# Patient Record
Sex: MALE | Race: WHITE | NOT HISPANIC OR LATINO | Employment: UNEMPLOYED | URBAN - METROPOLITAN AREA
[De-identification: names, ages, dates, MRNs, and addresses within clinical notes are randomized per-mention and may not be internally consistent; named-entity substitution may affect disease eponyms.]

---

## 2020-04-21 ENCOUNTER — VIRTUAL VISIT (OUTPATIENT)
Dept: FAMILY MEDICINE | Facility: CLINIC | Age: 60
End: 2020-04-21

## 2020-04-21 DIAGNOSIS — R51.9 ACUTE NONINTRACTABLE HEADACHE, UNSPECIFIED HEADACHE TYPE: Primary | ICD-10-CM

## 2020-04-21 PROCEDURE — 99202 OFFICE O/P NEW SF 15 MIN: CPT | Mod: 95 | Performed by: NURSE PRACTITIONER

## 2020-04-21 NOTE — LETTER
[facility]  [address] [place] [id number] [phone]          April 21, 2020  RE:  Vasyl Park                                                                                                            [address] [facility] [address]      To whom it may concern:    Vasyl Park is under my professional care. Please excuse him from work today, 4/21/2020. He had a self limited health issue without concerning symptoms for COVID19. This has resolved and he feels comfortable returning to work on 4/22/2020.    Sincerely,      Ayleen Cooper CNP  [facility]

## 2020-04-21 NOTE — PROGRESS NOTES
"Problem(s) Oriented visit        SUBJECTIVE:                                                    Vasyl Park is a 59 year old male who presents for a telephone visit regarding a headache    The patient has been notified of following:     \"This telephone visit will be conducted via a call between you and your physician/provider. We have found that certain health care needs can be provided without the need for an in-person physical exam.  This service lets us provide the care you need with a telephone conversation.  If a prescription is necessary we can send it directly to your pharmacy.  If lab work is needed we can place an order for that and you can then stop by our lab to have the test done at a later time.    If during the course of the call the physician/provider feels a telephone visit is not appropriate, you will not be charged for this service.\"     Provider has received verbal consent for a video visit from the patient? Yes      Vasyl is currently feeling well. He developed a headache in the middle of the night, located at the top of his skull in the center- described it as a 5/10 on the pain scale but described this as \"not painful, just annoying.\" Unable to further describe the headache- denies pulsating, throbbing, electric, or tense squeezing type pain. No photophobia, sonophobia, fevers, chills, nausea, vomiting, changes in vision, weakness, slurred speech, confusion. He does admit that he was drinking alcohol last night but states he did not feel hungover. Reports that he gets these types of headaches a couple times per year and states they are migraines. He did not take anything for the discomfort, but stayed home from work and rested with resolution of the headache. He is currently feeling well without any residual headache, fatigue, or nausea. No neurologic changes. His work is requiring him to have a note to get back to work due to COVID19 outbreak. He denies persistent headache, fevers, chills, " malaise, fatigue, myalgias, cough, shortness of breath, rhinorrhea, congestion, nausea, vomiting, diarrhea, anosmia. He would like to go to work tomorrow.    Problem list, Medication list, Allergies, and Medical/Social/Surgical histories reviewed in [facility] and updated as appropriate.   Additional history: as documented    ROS:  Constitutional, HEENT, CV, resp, GI, MSK, neuro negative except as listed per HPI    Histories:   There is no problem list on file for this patient.    No past surgical history on file.    Social History     Tobacco Use     Smoking status: Not on file   Substance Use Topics     Alcohol use: Not on file     No family history on file.      No current outpatient medications on file.       OBJECTIVE:                                                    No vitals taken- telephone visit  Constitutional: Speaking in full sentences  Psych: Pleasant and interactive, answers questions appropriately, thought content logical       ASSESSMENT/PLAN:                                                        Diagnoses and all orders for this visit:    Acute nonintractable headache, unspecified headache type    Headache that was self limited and resolved- discussed with him that his symptoms are not consistent with a migraine- possibly a tension type headache, possibly related to alcohol last night. No neurologic deficits per his report, no evidence of a viral syndrome. His symptoms have completely resolved and he would like a note to return to work. Note written, reviewed s/sxs COVID19.    Patient needs assistance with ADLs: none identified today  Patient needs assistance with iADLs: none identified today    The following health maintenance items are reviewed in Epic and correct as of today:  Health Maintenance   Topic Date Due     PREVENTIVE CARE VISIT  1960     HEPATITIS C SCREENING  1960     ADVANCE CARE PLANNING  1960     COLORECTAL CANCER SCREENING  12/05/1971     DTAP/TDAP/TD IMMUNIZATION (1  - Tdap) 12/05/1972     HIV SCREENING  12/05/1976     LIPID  12/05/1996     ZOSTER IMMUNIZATION (1 of 2) 12/05/2011     INFLUENZA VACCINE (1) 09/01/2019     PHQ-2  01/01/2020     IPV IMMUNIZATION  Aged Out     MENINGITIS IMMUNIZATION  Aged Out       This was a telephone visit conducted during COVID-19 outbreak in regulation with social distancing and quarantine recommendations of the CDC and [place] department of health and human services. A two way audio connection was used in real time with patient's consent.    FERNY Pollock CNP  [facility]  Family Practice  [facility]  [phone]    For any issues my office # is [phone]

## 2020-08-26 ENCOUNTER — ANCILLARY PROCEDURE (OUTPATIENT)
Dept: GENERAL RADIOLOGY | Facility: CLINIC | Age: 60
End: 2020-08-26
Attending: PHYSICIAN ASSISTANT
Payer: COMMERCIAL

## 2020-08-26 ENCOUNTER — OFFICE VISIT (OUTPATIENT)
Dept: URGENT CARE | Facility: URGENT CARE | Age: 60
End: 2020-08-26
Payer: COMMERCIAL

## 2020-08-26 VITALS
SYSTOLIC BLOOD PRESSURE: 85 MMHG | WEIGHT: 200 LBS | OXYGEN SATURATION: 100 % | DIASTOLIC BLOOD PRESSURE: 62 MMHG | HEART RATE: 92 BPM | TEMPERATURE: 98.5 F

## 2020-08-26 DIAGNOSIS — M79.671 RIGHT FOOT PAIN: ICD-10-CM

## 2020-08-26 DIAGNOSIS — S92.331A CLOSED DISPLACED FRACTURE OF THIRD METATARSAL BONE OF RIGHT FOOT, INITIAL ENCOUNTER: Primary | ICD-10-CM

## 2020-08-26 PROCEDURE — 99214 OFFICE O/P EST MOD 30 MIN: CPT | Performed by: PHYSICIAN ASSISTANT

## 2020-08-26 PROCEDURE — 73630 X-RAY EXAM OF FOOT: CPT | Mod: RT

## 2020-08-26 ASSESSMENT — ENCOUNTER SYMPTOMS
PSYCHIATRIC NEGATIVE: 1
RESPIRATORY NEGATIVE: 1
CARDIOVASCULAR NEGATIVE: 1
CONSTITUTIONAL NEGATIVE: 1
EYES NEGATIVE: 1
NEUROLOGICAL NEGATIVE: 1
GASTROINTESTINAL NEGATIVE: 1

## 2020-08-26 NOTE — LETTER
August 26, 2020      To Whom It May Concern:      Vasyl Park was seen in our urgent care today, 08/26/20.  I expect his condition to improve over the next 6-8 weeks.  He may return to work when improved.    Sincerely,        Chano Whaley PA-C

## 2020-08-27 NOTE — PATIENT INSTRUCTIONS
Patient Education     RICE     Rest an injury, elevate it, and use ice and compression as directed.   RICE stands for rest, ice, compression, and elevation. These can limit pain and swelling after an injury. RICE may be recommended to help treat breaks (fractures), sprains, strains, and bruises or bumps.   Home care  Here are the details of RICE:    Rest. Limit the use of the injured body part. This helps prevent further damage to the body part and gives it time to heal. In some cases, you may need a sling, brace, splint, or cast to help keep the body part still until it has healed.    Ice. Applying ice right after an injury helps relieve pain and swelling. To make an ice pack, put ice cubes in a plastic bag that seals at the top. Wrap the bag in a clean, thin towel or cloth. Then place it over the injured area. Do this for 10 to 15 minutes every 3 to 4 hours. Continue for the next 1 to 3 days or until your symptoms improve. Never put ice directly on your skin. Don't ice an area longer than 15 minutes at a time.    Compression. Putting pressure on an injury helps reduce swelling and provides support. Wrap the injured area firmly with an elastic bandage or wrap. Make sure not to wrap the bandage too tightly or you will cut off blood flow to the injured area. If your bandage loosens, rewrap it.    Elevation. Keeping an injury raised or elevated above the level of your heart reduces swelling, pain, and throbbing. For instance, if you have a broken leg, it may help to rest your leg on several pillows when sitting or lying down. Try to keep the injured area elevated as often as possible.  Follow-up care  Follow up with your healthcare provider, or as advised.  When to seek medical advice  Call your healthcare provider right away if any of these occur:    Fever of 100.4 F (38 C) or higher, or as directed by your healthcare provider    Chills    Increased pain or swelling in the injured body part    Injured body part  becomes cold, blue, numb, or tingly    Signs of infection. These include warmth in the skin, redness, drainage, or bad smell coming from the injured body part.  Date Last Reviewed: 6/1/2018 2000-2019 [employer]. [address] [place] [id number]. All rights reserved. This information is not intended as a substitute for professional medical care. Always follow your healthcare professional's instructions.

## 2020-08-27 NOTE — PROGRESS NOTES
SUBJECTIVE:   Vasyl Park is a 59 year old male presenting with a chief complaint of   Chief Complaint   Patient presents with     Urgent Care     Musculoskeletal Problem     pt jumped off a bench a week and half ago and hurt right foot.       He is an established patient of [facility].    MS Injury/Pain    Onset of symptoms was 1 week(s) ago.  Location: right foot  Context:       The injury happened while at home      Mechanism: see above      Patient experienced delayed pain, delayed swelling, was able to bear weight directly after injury, no deformity was noted by the patient  Course of symptoms is worsening.    Severity moderate  Current and Associated symptoms: Pain and Swelling  Denies  Bruising, Decreased range of motion and Stiffness  Aggravating Factors: walking, running and weight-bearing  Therapies to improve symptoms include: ice, Tylenol, rest and elevation  This is the first time this type of problem has occurred for this patient.     Review of Systems   Constitutional: Negative.    HENT: Negative.    Eyes: Negative.    Respiratory: Negative.    Cardiovascular: Negative.    Gastrointestinal: Negative.    Genitourinary: Negative.    Neurological: Negative.    Psychiatric/Behavioral: Negative.        No past medical history on file.  No family history on file.  No current outpatient medications on file.     Social History     Tobacco Use     Smoking status: Not on file   Substance Use Topics     Alcohol use: Not on file       OBJECTIVE  BP (!) 85/62   Pulse 92   Temp 98.5  F (36.9  C) (Oral)   Wt 90.7 kg (200 lb)   SpO2 100%     Physical Exam  Constitutional:       General: He is not in acute distress.     Appearance: Normal appearance. He is normal weight. He is not ill-appearing, toxic-appearing or diaphoretic.   HENT:      Head: Normocephalic and atraumatic.   Cardiovascular:      Rate and Rhythm: Normal rate and regular rhythm.      Pulses: Normal pulses.      Heart sounds: Normal heart sounds.  No murmur. No friction rub. No gallop.    Pulmonary:      Effort: Pulmonary effort is normal. No respiratory distress.      Breath sounds: Normal breath sounds. No stridor. No wheezing, rhonchi or rales.   Chest:      Chest wall: No tenderness.   Musculoskeletal:      Right foot: Normal range of motion. No deformity.      Left foot: Normal range of motion. No deformity.        Feet:    Feet:      Right foot:      Skin integrity: Skin breakdown, callus, dry skin and fissure present. No warmth.      Toenail Condition: Right toenails are abnormally thick. Fungal disease present.     Left foot:      Skin integrity: Skin breakdown, callus, dry skin and fissure present. No warmth.      Toenail Condition: Left toenails are abnormally thick. Fungal disease present.     Comments: Foot is tender in the area shown above.   Neurological:      General: No focal deficit present.      Mental Status: He is alert and oriented to person, place, and time. Mental status is at baseline.      Sensory: No sensory deficit.      Motor: No weakness.      Gait: Gait normal.      Deep Tendon Reflexes: Reflexes normal.   Psychiatric:         Mood and Affect: Mood normal.         Behavior: Behavior normal.         Thought Content: Thought content normal.         Judgment: Judgment normal.       An X-ray was ordered today and reviewed by me. There does appear to be a fracture of the 3rd metatarsal. Fragments present. Awaiting radiology report.       ASSESSMENT/PLAN:    (S92.331A) Closed displaced fracture of third metatarsal bone of right foot, initial encounter  (primary encounter diagnosis)  Plan: Orthopedic & Spine  Referral,         Ankle/Foot Bracing Supplies Order for DME -         ONLY FOR DME, Crutches Order for DME - ONLY FOR        DME    (M79.671) Right foot pain  Plan: XR Foot Right G/E 3 Views, Orthopedic & Spine          Referral, Ankle/Foot Bracing Supplies        Order for DME - ONLY FOR DME, Crutches Order          for DME - ONLY FOR DME    Rest the affected area as much as possible.  Apply ice for 15-20 minutes intermittently as needed and especially after any offending activity. Hot packs are better for muscle spasms and cramping. Daily stretching as tolerated.  As pain recedes, begin normal activities slowly as tolerated.  Consider Physical Therapy after 6 weeks if symptoms not better with conservative care.      Okay to take acetaminophen 500 mg- 2 tabs (Total of 1000 mg) every 8 hrs   Okay to take ibuprofen 200 mg- 3 tabs (Total of 600 mg) every 6 hours      Follow up with orthopedics within the week.     Chano Whaley PA-C on 8/26/2020 at 8:25 PM

## 2020-09-04 ENCOUNTER — OFFICE VISIT (OUTPATIENT)
Dept: PODIATRY | Facility: CLINIC | Age: 60
End: 2020-09-04
Payer: COMMERCIAL

## 2020-09-04 VITALS — HEART RATE: 84 BPM | SYSTOLIC BLOOD PRESSURE: 128 MMHG | DIASTOLIC BLOOD PRESSURE: 80 MMHG

## 2020-09-04 DIAGNOSIS — S92.331A CLOSED DISPLACED FRACTURE OF THIRD METATARSAL BONE OF RIGHT FOOT, INITIAL ENCOUNTER: Primary | ICD-10-CM

## 2020-09-04 PROCEDURE — 28470 CLTX METATARSAL FX WO MNP EA: CPT | Mod: RT | Performed by: PODIATRIST

## 2020-09-04 PROCEDURE — 99203 OFFICE O/P NEW LOW 30 MIN: CPT | Mod: 57 | Performed by: PODIATRIST

## 2020-09-04 NOTE — LETTER
9/4/2020         RE: Vasyl Park  [address]  [facility] [address]        Dear Colleague,    Thank you for referring your patient, Vasyl Park, to the [facility]. Please see a copy of my visit note below.    Subjective:    Pt is seen today in consult from Chano Whaley for right third metatarsal fracture.  Patient injured his right foot at work on 8/19/2020.  Moving heavy object which came on top of his foot.  He had pain.  He ignored this and continue to walk on it.  Slowly over the next few days foot became more painful.  He continue to work.  He presented to clinic on 826 and diagnosed with a fracture.  Was given a walking boot.  This is helped.  He works on his feet.  He has not gone back to work.  The patient is a smoker.  He is to drink alcohol quite heavily in the past and still drinks but last.  He also has history of psoriasis.  He states this affects both of his feet.      ROS:  A 10-point review of systems was performed and is positive for that noted in the HPI and as seen above.  All other areas are negative.        No Known Allergies    No current outpatient medications on file.       There is no problem list on file for this patient.      No past medical history on file.    No past surgical history on file.    No family history on file.    Social History     Tobacco Use     Smoking status: Smoker, Current Status Unknown     Smokeless tobacco: Never Used   Substance Use Topics     Alcohol use: Not on file         Exam:    Vitals: /80   Pulse 84   BMI: There is no height or weight on file to calculate BMI.  Height: Data Unavailable    Constitutional/ general:  Pt is in no apparent distress, appears well-nourished.  Cooperative with history and physical exam.     Psych:  The patient answered questions appropriately.  Normal affect.  Seems to have reasonable expectations, in terms of treatment.     Eyes:  Visual scanning/ tracking without deficit.     Ears:  Response to  auditory stimuli is normal.  negative hearing aid devices.  Auricles in proper alignment.     Lymphatic:  Popliteal lymph nodes not enlarged.     Lungs:  Non labored breathing, non labored speech. No cough.  No audible wheezing. Even, quiet breathing.       Vascular:  positive pedal pulses bilaterally for  DP arteries.  Somewhat difficult to palpate tibial arteries secondary to ankle edema.  CFT < 3 sec.  positive ankle edema.  No hair growth noted.  His toes are cold to touch.    Neuro:  Alert and oriented x 3. Coordinated gait.  Light touch sensation is intact to the L4, L5, S1 distributions. No obvious deficits.  No evidence of neurological-based weakness, spasticity, or contracture in the lower extremities.     Derm: His skin is very dry and erythematous globally on his foot.  No hair growth is noted.  Plantarly the skin is quite thick.    Musculoskeletal:    Lower extremity muscle strength is normal.  Patient is ambulatory without an assistive device or brace.  No gross deformities.  Normal arch.  Pain upon palpation  of right third  metatarsal.  Edema  noted.  No erythema or ecchymosis noted.  No sign of ulceration, infection, or drainage.      Radiographic Exam:  X-Ray Findings:  I personally reviewed the films.    FOOT THREE VIEWS RIGHT  8/26/2020 8:20 PM      HISTORY: Right foot pain     COMPARISON: None.                                                                      IMPRESSION: Acute transversely oriented fracture of the third  metatarsal mid diaphysis. There is 2 mm dorsal displacement of the  dominant distal fracture fragment. There are small mildly displaced  fracture fragments medially and laterally. No intra-articular  extension. Mild soft tissue swelling. There is normal joint spacing  and alignment. Chronic deformity of the fifth proximal phalanx. Small  plantar and Achilles calcaneal enthesophytes.    Assessment: Right foot third metatarsal fracture     Plan:  X-rays personally reviewed.   Discussed etiology and treatment options with the patient in detail.   Discussed with patient that metatarsal slightly elevated and shorter.  This could cause adjacent metatarsalgia in the future.  We discussed ways to compensated for this.  He is not a good surgical candidate.  After long discussions of pros and cons of conservative versus surgical treatment he elects to have conservative treatment.  Will dispense plantarflexed cam walker today to offload his metatarsal heads if he has to bear weight on this..  Patient to wear this at all times to protect foot while moving around.  Discussed even though he has boot on foot he should not walk.  He has crutches.  We discussed knee walker but he declines..  When not walking patient will take this off and do ROM to prevent blood clot and joint stiffness.  Patient will not sleep with this on.  If displacement or signs of slow healing pt may require surgery.  With patient's past medical history anticipate this to be slow to heal so wrote work note for no work for next 8 weeks.  F/U 2 wks for repeat x-ray.  Fracture care.   thank you for allowing me participate in the care of this patient.        Paulie Burch DPM DPM, FACFAS      Again, thank you for allowing me to participate in the care of your patient.        Sincerely,        Paulie Burch DPM

## 2020-09-04 NOTE — PROGRESS NOTES
Subjective:    Pt is seen today in consult from Chano Whaley for right third metatarsal fracture.  Patient injured his right foot at work on 8/19/2020.  Moving heavy object which came on top of his foot.  He had pain.  He ignored this and continue to walk on it.  Slowly over the next few days foot became more painful.  He continue to work.  He presented to clinic on 826 and diagnosed with a fracture.  Was given a walking boot.  This is helped.  He works on his feet.  He has not gone back to work.  The patient is a smoker.  He is to drink alcohol quite heavily in the past and still drinks but last.  He also has history of psoriasis.  He states this affects both of his feet.      ROS:  A 10-point review of systems was performed and is positive for that noted in the HPI and as seen above.  All other areas are negative.        No Known Allergies    No current outpatient medications on file.       There is no problem list on file for this patient.      No past medical history on file.    No past surgical history on file.    No family history on file.    Social History     Tobacco Use     Smoking status: Smoker, Current Status Unknown     Smokeless tobacco: Never Used   Substance Use Topics     Alcohol use: Not on file         Exam:    Vitals: /80   Pulse 84   BMI: There is no height or weight on file to calculate BMI.  Height: Data Unavailable    Constitutional/ general:  Pt is in no apparent distress, appears well-nourished.  Cooperative with history and physical exam.     Psych:  The patient answered questions appropriately.  Normal affect.  Seems to have reasonable expectations, in terms of treatment.     Eyes:  Visual scanning/ tracking without deficit.     Ears:  Response to auditory stimuli is normal.  negative hearing aid devices.  Auricles in proper alignment.     Lymphatic:  Popliteal lymph nodes not enlarged.     Lungs:  Non labored breathing, non labored speech. No cough.  No audible wheezing. Even,  quiet breathing.       Vascular:  positive pedal pulses bilaterally for  DP arteries.  Somewhat difficult to palpate tibial arteries secondary to ankle edema.  CFT < 3 sec.  positive ankle edema.  No hair growth noted.  His toes are cold to touch.    Neuro:  Alert and oriented x 3. Coordinated gait.  Light touch sensation is intact to the L4, L5, S1 distributions. No obvious deficits.  No evidence of neurological-based weakness, spasticity, or contracture in the lower extremities.     Derm: His skin is very dry and erythematous globally on his foot.  No hair growth is noted.  Plantarly the skin is quite thick.    Musculoskeletal:    Lower extremity muscle strength is normal.  Patient is ambulatory without an assistive device or brace.  No gross deformities.  Normal arch.  Pain upon palpation  of right third  metatarsal.  Edema  noted.  No erythema or ecchymosis noted.  No sign of ulceration, infection, or drainage.      Radiographic Exam:  X-Ray Findings:  I personally reviewed the films.    FOOT THREE VIEWS RIGHT  8/26/2020 8:20 PM      HISTORY: Right foot pain     COMPARISON: None.                                                                      IMPRESSION: Acute transversely oriented fracture of the third  metatarsal mid diaphysis. There is 2 mm dorsal displacement of the  dominant distal fracture fragment. There are small mildly displaced  fracture fragments medially and laterally. No intra-articular  extension. Mild soft tissue swelling. There is normal joint spacing  and alignment. Chronic deformity of the fifth proximal phalanx. Small  plantar and Achilles calcaneal enthesophytes.    Assessment: Right foot third metatarsal fracture     Plan:  X-rays personally reviewed.  Discussed etiology and treatment options with the patient in detail.   Discussed with patient that metatarsal slightly elevated and shorter.  This could cause adjacent metatarsalgia in the future.  We discussed ways to compensated for  this.  He is not a good surgical candidate.  After long discussions of pros and cons of conservative versus surgical treatment he elects to have conservative treatment.  Will dispense plantarflexed cam walker today to offload his metatarsal heads if he has to bear weight on this..  Patient to wear this at all times to protect foot while moving around.  Discussed even though he has boot on foot he should not walk.  He has crutches.  We discussed knee walker but he declines..  When not walking patient will take this off and do ROM to prevent blood clot and joint stiffness.  Patient will not sleep with this on.  If displacement or signs of slow healing pt may require surgery.  With patient's past medical history anticipate this to be slow to heal so wrote work note for no work for next 8 weeks.  F/U 2 wks for repeat x-ray.  Fracture care.   thank you for allowing me participate in the care of this patient.        Paulie Burch, CITLALY DPM, FACFAS

## 2020-09-04 NOTE — LETTER
[address]  [place] [address]  Phone: [phone]    September 4, 2020        Vasyl Park  [address]  [facility] [address]          To whom it may concern:    RE: Vasyl Park    Patient was seen and treated today at our clinic and missed work.    NO WORK FOR 8 WEEKS.    Please contact me for questions or concerns.      Sincerely,        Dr. Paulie Burch D.P.M FAC FAS/LLD

## 2020-09-04 NOTE — NURSING NOTE
Weight management plan: Patient was referred to their PCP to discuss a diet and exercise plan.     SMOKING CESSATION  What's in cigarette smoke? - Cigarette smoke contains over 4,000 chemicals. Nicotine is one of the main ingredients which is an insecticide/herbicide. It is poisonous to our nervous system, increases blood clotting risk, and decreases the body's defenses to fight off infection. Another chemical is Carbon Monoxide is an asphyxiating gas that permanently binds to blood cells and blocks the transport of oxygen. This leads to tissue death and decreases your metabolism. Tar is a chemical that coats your lungs and trachea which impairs new oxygen coming in and carbon dioxide getting out of your body.   How does smoking impact surgery? - Smoking is particularly hazardous with regards to surgery. Surgery puts stress on the body and a smoker's body is already under strain from these chemicals. Putting the two together, especially for an elective surgery, could be a recipe for disaster. Smoking before and after surgery increases your risk of heart problems, slow wound healing, delayed bone healing, blood clots, wound infection and anesthesia complications.   What are the benefits of quitting? - In 20 minutes your blood pressure will drop back down to normal. In 8 hours the carbon monoxide (a toxic gas) levels in your blood stream will drop by half, and oxygen levels will return to normal. In 48 hours your chance of having a heart attack will have decreased. All nicotine will have left your body. Your sense of taste and smell will return to a normal level. In 72 hours your bronchial tubes will relax, and your energy levels will increase. In 2 weeks your circulation will increase, and it will continue to improve for the next 10 weeks.    Recommendations for elective surgery - Ideally, patients should quit smoking 8 weeks before and at least 2 weeks after elective surgery in order to avoid complications. Simply  cutting back on the amount of cigarettes smoked per day does not offer any benefit or decrease the risk of poor wound healing, heart problems, and infection. Smokers should also start taking Vitamin C and B for two weeks before surgery and two weeks after surgery.    Ways to Stop Smokin. Nicotine patches, lozenges, or gum  2. Support Groups  3. Medications (see below)    List of Medications:  1. Varenicline Tartrate (CHANTIX)   2. Bupropion HCL (WELLBUTRIN, ZYBAN) - note: make sure Wellbutrin is for smoking cessation and not other issues   3. Nicotine Patch (NICODERM)   4. Nicotine Inhaler (NICOTROL)   5. Nicotine Gum Nicotine Polacrilex   6. Nicotine Lozenge: Nicotine Polacrilex (COMMIT)   * [place] offers a smoking support group as well!  Please visit: [email or web address]  If you are interested in these, ask about getting a prescription or talk to your primary care doctor about what may be the best way for you to quit.

## 2020-09-04 NOTE — PATIENT INSTRUCTIONS
We wish you continued good healing. If you have any questions or concerns, please do not hesitate to contact us at [phone]    Please remember to call and schedule a follow up appointment if one was recommended at your earliest convenience.   PODIATRY CLINIC HOURS  TELEPHONE NUMBER    Dr. Paulie Burch D.P.M [facility]    Clinics:  [facility]    [facility]   Tuesday 1PM-6PM  [place]  Wednesday 7AM-2PM  [facility]  Thursday 10AM-6PM  [place]  Friday 7AM-3PM  [place]  Specialty schedulers:   [phone] to make an appointment with any Specialty Provider.        Urgent Care locations:    [facility] Monday-Friday 5 pm - 9 pm. Saturday-Sunday 9 am -5pm    Monday-Friday 11 am - 9 pm Saturday 9 am - 5 pm     Monday-Sunday 12 noon-8PM [phone]     If you need a medication refill, please contact us you may need lab work and/or a follow up visit prior to your refill (i.e. Antifungal medications).    [employer] (secure e-mail communication and access to your chart) to send a message or to make an appointment.    If MRI needed please call [facility] at [phone]

## 2020-09-18 ENCOUNTER — ANCILLARY PROCEDURE (OUTPATIENT)
Dept: GENERAL RADIOLOGY | Facility: CLINIC | Age: 60
End: 2020-09-18
Attending: PODIATRIST
Payer: COMMERCIAL

## 2020-09-18 ENCOUNTER — OFFICE VISIT (OUTPATIENT)
Dept: PODIATRY | Facility: CLINIC | Age: 60
End: 2020-09-18
Payer: COMMERCIAL

## 2020-09-18 VITALS — SYSTOLIC BLOOD PRESSURE: 107 MMHG | HEART RATE: 80 BPM | WEIGHT: 200 LBS | DIASTOLIC BLOOD PRESSURE: 70 MMHG

## 2020-09-18 DIAGNOSIS — S92.331A CLOSED DISPLACED FRACTURE OF THIRD METATARSAL BONE OF RIGHT FOOT, INITIAL ENCOUNTER: ICD-10-CM

## 2020-09-18 DIAGNOSIS — S92.331A CLOSED DISPLACED FRACTURE OF THIRD METATARSAL BONE OF RIGHT FOOT, INITIAL ENCOUNTER: Primary | ICD-10-CM

## 2020-09-18 PROCEDURE — 73630 X-RAY EXAM OF FOOT: CPT | Mod: RT

## 2020-09-18 PROCEDURE — 99207 ZZC FRACTURE CARE IN GLOBAL PERIOD: CPT | Performed by: PODIATRIST

## 2020-09-18 NOTE — LETTER
[facility]  [address] [place] [address]  [place] [address]  Phone: [phone]    September 18, 2020        Vasyl Park  [address]  [place] [address]          To whom it may concern:    RE: Vasyl Park    Patient was seen and treated today at our clinic.    No work for 3 weeks 09/18/20-10/9/20.     Due to foot injury      Please contact me for questions or concerns.      Sincerely,        Dr. Paulie AGUILARPMIKE FAC FAS/ld

## 2020-09-18 NOTE — PATIENT INSTRUCTIONS
We wish you continued good healing. If you have any questions or concerns, please do not hesitate to contact us at [phone]    Please remember to call and schedule a follow up appointment if one was recommended at your earliest convenience.   PODIATRY CLINIC HOURS  TELEPHONE NUMBER    Dr. Paulie Burch D.P.M [facility]    Clinics:  [facility]    [facility]   Tuesday 1PM-6PM  [place]  Wednesday 7AM-2PM  [facility]  Thursday 10AM-6PM  [place]  Friday 7AM-3PM  [place]  Specialty schedulers:   [phone] to make an appointment with any Specialty Provider.        Urgent Care locations:    [facility] Monday-Friday 5 pm - 9 pm. Saturday-Sunday 9 am -5pm    Monday-Friday 11 am - 9 pm Saturday 9 am - 5 pm     Monday-Sunday 12 noon-8PM [phone]     If you need a medication refill, please contact us you may need lab work and/or a follow up visit prior to your refill (i.e. Antifungal medications).    [employer] (secure e-mail communication and access to your chart) to send a message or to make an appointment.    If MRI needed please call [place] [facility] at [phone]

## 2020-09-18 NOTE — LETTER
9/18/2020         RE: Vasyl Park  [address]  [place] [address]        Dear Colleague,    Thank you for referring your patient, Vasyl Park, to the [facility]. Please see a copy of my visit note below.    Subjective:    9/4/20   Pt is seen today in consult from Chano Whaley for right third metatarsal fracture.  Patient injured his right foot at work on 8/19/2020.  Moving heavy object which came on top of his foot.  He had pain.  He ignored this and continue to walk on it.  Slowly over the next few days foot became more painful.  He continue to work.  He presented to clinic on 826 and diagnosed with a fracture.  Was given a walking boot.  This is helped.  He works on his feet.  He has not gone back to work.  The patient is a smoker.  He is to drink alcohol quite heavily in the past and still drinks but last.  He also has history of psoriasis.  He states this affects both of his feet.    9/18/20 patient has been using the cam walker anytime he puts pressure on his foot.  He is not working.  Patient using crutches to keep off this.  He states his swelling and pain have improved.  Both are still present somewhat.  He is still smoking.         ROS:  See above       No Known Allergies    No current outpatient medications on file.       There is no problem list on file for this patient.      No past medical history on file.    No past surgical history on file.    No family history on file.    Social History     Tobacco Use     Smoking status: Smoker, Current Status Unknown     Smokeless tobacco: Never Used   Substance Use Topics     Alcohol use: Not on file         Exam:    Vitals: There were no vitals taken for this visit.  BMI: There is no height or weight on file to calculate BMI.  Height: Data Unavailable    Constitutional/ general:  Pt is in no apparent distress, appears well-nourished.  Cooperative with history and physical exam.     Psych:  The patient answered questions  appropriately.  Normal affect.  Seems to have reasonable expectations, in terms of treatment.     Eyes:  Visual scanning/ tracking without deficit.     Ears:  Response to auditory stimuli is normal.  negative hearing aid devices.  Auricles in proper alignment.     Lymphatic:  Popliteal lymph nodes not enlarged.     Lungs:  Non labored breathing, non labored speech. No cough.  No audible wheezing. Even, quiet breathing.       Vascular:  positive pedal pulses bilaterally for  DP arteries.  Somewhat difficult to palpate tibial arteries secondary to ankle edema.  CFT < 3 sec.  positive ankle edema.  No hair growth noted.  His toes are cold to touch.    Neuro:  Alert and oriented x 3. Coordinated gait.  Light touch sensation is intact to the L4, L5, S1 distributions. No obvious deficits.  No evidence of neurological-based weakness, spasticity, or contracture in the lower extremities.     Derm: His skin is very dry and erythematous globally on his foot.  No hair growth is noted.  Plantarly the skin is quite thick.    Musculoskeletal:    Lower extremity muscle strength is normal.  Patient is ambulatory without an assistive device or brace.  No gross deformities.  Normal arch.  Pain upon palpation  of right third  metatarsal.  Edema  noted.  No erythema or ecchymosis noted.  No sign of ulceration, infection, or drainage.      Radiographic Exam:  X-Ray shows copious bone callus, however fracture not yet healed.  Distal part of fracture may be slightly less elevated.    Assessment: Right foot third metatarsal fracture     Plan:  X-rays taken today of right foot.  Discussed he has copious bone callus.  The elevation of the distal portion of the third metatarsal may have even reduced somewhat.  Continue protecting this with the boot and nonweightbearing at all times.  We again done discussed this will be slower to heal because of his smoking.  Encourage cessation.  Return to the clinic in 3 weeks.  Continue to work.  Fracture  care.        Paulie Burch DPM DPM, FACFAS      Again, thank you for allowing me to participate in the care of your patient.        Sincerely,        Paulie Burch DPM

## 2020-09-18 NOTE — NURSING NOTE
Weight management plan: Patient was referred to their PCP to discuss a diet and exercise plan.     SMOKING CESSATION  What's in cigarette smoke? - Cigarette smoke contains over 4,000 chemicals. Nicotine is one of the main ingredients which is an insecticide/herbicide. It is poisonous to our nervous system, increases blood clotting risk, and decreases the body's defenses to fight off infection. Another chemical is Carbon Monoxide is an asphyxiating gas that permanently binds to blood cells and blocks the transport of oxygen. This leads to tissue death and decreases your metabolism. Tar is a chemical that coats your lungs and trachea which impairs new oxygen coming in and carbon dioxide getting out of your body.   How does smoking impact surgery? - Smoking is particularly hazardous with regards to surgery. Surgery puts stress on the body and a smoker's body is already under strain from these chemicals. Putting the two together, especially for an elective surgery, could be a recipe for disaster. Smoking before and after surgery increases your risk of heart problems, slow wound healing, delayed bone healing, blood clots, wound infection and anesthesia complications.   What are the benefits of quitting? - In 20 minutes your blood pressure will drop back down to normal. In 8 hours the carbon monoxide (a toxic gas) levels in your blood stream will drop by half, and oxygen levels will return to normal. In 48 hours your chance of having a heart attack will have decreased. All nicotine will have left your body. Your sense of taste and smell will return to a normal level. In 72 hours your bronchial tubes will relax, and your energy levels will increase. In 2 weeks your circulation will increase, and it will continue to improve for the next 10 weeks.    Recommendations for elective surgery - Ideally, patients should quit smoking 8 weeks before and at least 2 weeks after elective surgery in order to avoid complications. Simply  cutting back on the amount of cigarettes smoked per day does not offer any benefit or decrease the risk of poor wound healing, heart problems, and infection. Smokers should also start taking Vitamin C and B for two weeks before surgery and two weeks after surgery.    Ways to Stop Smokin. Nicotine patches, lozenges, or gum  2. Support Groups  3. Medications (see below)    List of Medications:  1. Varenicline Tartrate (CHANTIX)   2. Bupropion HCL (WELLBUTRIN, ZYBAN) - note: make sure Wellbutrin is for smoking cessation and not other issues   3. Nicotine Patch (NICODERM)   4. Nicotine Inhaler (NICOTROL)   5. Nicotine Gum Nicotine Polacrilex   6. Nicotine Lozenge: Nicotine Polacrilex (COMMIT)   * [place] offers a smoking support group as well!  Please visit: [email or web address]  If you are interested in these, ask about getting a prescription or talk to your primary care doctor about what may be the best way for you to quit.

## 2020-09-18 NOTE — PROGRESS NOTES
Subjective:    9/4/20   Pt is seen today in consult from Chano Whaley for right third metatarsal fracture.  Patient injured his right foot at work on 8/19/2020.  Moving heavy object which came on top of his foot.  He had pain.  He ignored this and continue to walk on it.  Slowly over the next few days foot became more painful.  He continue to work.  He presented to clinic on 826 and diagnosed with a fracture.  Was given a walking boot.  This is helped.  He works on his feet.  He has not gone back to work.  The patient is a smoker.  He is to drink alcohol quite heavily in the past and still drinks but last.  He also has history of psoriasis.  He states this affects both of his feet.    9/18/20 patient has been using the cam walker anytime he puts pressure on his foot.  He is not working.  Patient using crutches to keep off this.  He states his swelling and pain have improved.  Both are still present somewhat.  He is still smoking.         ROS:  See above       No Known Allergies    No current outpatient medications on file.       There is no problem list on file for this patient.      No past medical history on file.    No past surgical history on file.    No family history on file.    Social History     Tobacco Use     Smoking status: Smoker, Current Status Unknown     Smokeless tobacco: Never Used   Substance Use Topics     Alcohol use: Not on file         Exam:    Vitals: There were no vitals taken for this visit.  BMI: There is no height or weight on file to calculate BMI.  Height: Data Unavailable    Constitutional/ general:  Pt is in no apparent distress, appears well-nourished.  Cooperative with history and physical exam.     Psych:  The patient answered questions appropriately.  Normal affect.  Seems to have reasonable expectations, in terms of treatment.     Eyes:  Visual scanning/ tracking without deficit.     Ears:  Response to auditory stimuli is normal.  negative hearing aid devices.  Auricles in proper  alignment.     Lymphatic:  Popliteal lymph nodes not enlarged.     Lungs:  Non labored breathing, non labored speech. No cough.  No audible wheezing. Even, quiet breathing.       Vascular:  positive pedal pulses bilaterally for  DP arteries.  Somewhat difficult to palpate tibial arteries secondary to ankle edema.  CFT < 3 sec.  positive ankle edema.  No hair growth noted.  His toes are cold to touch.    Neuro:  Alert and oriented x 3. Coordinated gait.  Light touch sensation is intact to the L4, L5, S1 distributions. No obvious deficits.  No evidence of neurological-based weakness, spasticity, or contracture in the lower extremities.     Derm: His skin is very dry and erythematous globally on his foot.  No hair growth is noted.  Plantarly the skin is quite thick.    Musculoskeletal:    Lower extremity muscle strength is normal.  Patient is ambulatory without an assistive device or brace.  No gross deformities.  Normal arch.  Pain upon palpation  of right third  metatarsal.  Edema  noted.  No erythema or ecchymosis noted.  No sign of ulceration, infection, or drainage.      Radiographic Exam:  X-Ray shows copious bone callus, however fracture not yet healed.  Distal part of fracture may be slightly less elevated.    Assessment: Right foot third metatarsal fracture     Plan:  X-rays taken today of right foot.  Discussed he has copious bone callus.  The elevation of the distal portion of the third metatarsal may have even reduced somewhat.  Continue protecting this with the boot and nonweightbearing at all times.  We again done discussed this will be slower to heal because of his smoking.  Encourage cessation.  Return to the clinic in 3 weeks.  Continue to work.  Fracture care.        Paulie Burch, CITLALY BOUCHER, FACFAS

## 2020-11-11 ENCOUNTER — OFFICE VISIT (OUTPATIENT)
Dept: PODIATRY | Facility: CLINIC | Age: 60
End: 2020-11-11
Payer: COMMERCIAL

## 2020-11-11 ENCOUNTER — ANCILLARY PROCEDURE (OUTPATIENT)
Dept: GENERAL RADIOLOGY | Facility: CLINIC | Age: 60
End: 2020-11-11
Attending: PODIATRIST
Payer: COMMERCIAL

## 2020-11-11 VITALS — SYSTOLIC BLOOD PRESSURE: 118 MMHG | HEART RATE: 82 BPM | DIASTOLIC BLOOD PRESSURE: 72 MMHG | WEIGHT: 200 LBS

## 2020-11-11 DIAGNOSIS — S92.331A CLOSED DISPLACED FRACTURE OF THIRD METATARSAL BONE OF RIGHT FOOT, INITIAL ENCOUNTER: ICD-10-CM

## 2020-11-11 DIAGNOSIS — S92.331A CLOSED DISPLACED FRACTURE OF THIRD METATARSAL BONE OF RIGHT FOOT, INITIAL ENCOUNTER: Primary | ICD-10-CM

## 2020-11-11 PROCEDURE — 99207 PR FRACTURE CARE IN GLOBAL PERIOD: CPT | Performed by: PODIATRIST

## 2020-11-11 PROCEDURE — 73630 X-RAY EXAM OF FOOT: CPT | Mod: RT | Performed by: RADIOLOGY

## 2020-11-11 NOTE — LETTER
[facility]  [address] [place] [address]  Phone: [phone]    November 11, 2020        Vasyl Park  [address] [address]          To whom it may concern:    RE: Vasyl Park    Patient was seen and treated today at our clinic.  No work for 3 weeks.    Please contact me for questions or concerns.      Sincerely,        Dr. Paulie AGUILARPMIKE FAC FAS/lld

## 2020-11-11 NOTE — PROGRESS NOTES
Subjective:    9/4/20   Pt is seen today in consult from Chano Whaley for right third metatarsal fracture.  Patient injured his right foot at work on 8/19/2020.  Moving heavy object which came on top of his foot.  He had pain.  He ignored this and continue to walk on it.  Slowly over the next few days foot became more painful.  He continue to work.  He presented to clinic on 8/26 and diagnosed with a fracture.  Was given a walking boot.  This is helped.  He works on his feet.  He has not gone back to work.  The patient is a smoker.  He used to drink alcohol quite heavily in the past.  He also has history of psoriasis.  He states this affects both of his feet.    9/18/20 patient has been using the cam walker anytime he puts pressure on his foot.  He is not working.  Patient using crutches to keep off this.  He states his swelling and pain have improved.  Both are still present somewhat.  He is still smoking.     11/11/20 patient returns for evaluation of his right third metatarsal fracture..  He states is feeling better.  The edema is decreasing.  He still has numbness and tingling.         ROS:  See above       No Known Allergies    No current outpatient medications on file.       There is no problem list on file for this patient.      No past medical history on file.    No past surgical history on file.    No family history on file.    Social History     Tobacco Use     Smoking status: Smoker, Current Status Unknown     Smokeless tobacco: Never Used   Substance Use Topics     Alcohol use: Not on file         Exam:    Vitals: There were no vitals taken for this visit.  BMI: There is no height or weight on file to calculate BMI.  Height: Data Unavailable    Constitutional/ general:  Pt is in no apparent distress, appears well-nourished.  Cooperative with history and physical exam.     Psych:  The patient answered questions appropriately.  Normal affect.  Seems to have reasonable expectations, in terms of treatment.      Eyes:  Visual scanning/ tracking without deficit.     Ears:  Response to auditory stimuli is normal.  negative hearing aid devices.  Auricles in proper alignment.     Lymphatic:  Popliteal lymph nodes not enlarged.     Lungs:  Non labored breathing, non labored speech. No cough.  No audible wheezing. Even, quiet breathing.       Vascular:  positive pedal pulses bilaterally for  DP arteries.  Somewhat difficult to palpate tibial arteries secondary to ankle edema.  CFT < 3 sec.  positive ankle edema.  No hair growth noted.  His toes are cold to touch.    Neuro:  Alert and oriented x 3. Coordinated gait.  Light touch sensation is intact to the L4, L5, S1 distributions. No obvious deficits.  No evidence of neurological-based weakness, spasticity, or contracture in the lower extremities.     Derm: His skin is very dry and erythematous globally on his foot.  No hair growth is noted.  Plantarly the skin is quite thick.    Musculoskeletal:    Lower extremity muscle strength is normal.  Patient is ambulatory without an assistive device or brace.  No gross deformities.  Normal arch.  Pain upon palpation  of right third  metatarsal.  Edema almost totally resolved now.  No erythema or ecchymosis noted.  No sign of ulceration, infection, or drainage.  With loading the right third metatarsal head minimal pain.    Radiographic Exam:  X-Ray shows copious bone callus, however fracture not yet healed.     Assessment: Right foot third metatarsal fracture     Plan:  X-rays taken today of right foot.  Discussed he has copious bone callus.   discussed this is healing albeit slowly.  Patient will start tomorrow walking in good supportive shoe.  If there is no pain or edema then will slowly increase time in shoe 1-2 hours per day until walking all day in good shoe.  If patient has pain or edema back in cam walker for 5 days and then will try again.  No running, jogging, or high impact activities.  Return to the clinic in 3 weeks.    Fracture care.        Paulie Burch, DPM DPM, FACFAS

## 2020-11-11 NOTE — PATIENT INSTRUCTIONS
We wish you continued good healing. If you have any questions or concerns, please do not hesitate to contact us at [phone]    [employer] (secure e-mail communication and access to your chart) to send a message or to make an appointment.    Please remember to call and schedule a follow up appointment if one was recommended at your earliest convenience.     +++OF MARCH 2020+++ LOCATION AND HOURS HAVE CHANGED    PLEASE CALL CLINICS TO VERIFY DAYS AND TIMES  PODIATRY CLINIC HOURS  TELEPHONE NUMBER    Dr. Paulie AGUILARPMIKE [facility]        Clinics:  [facility]   Tuesday 1PM-6PM  [place]  Wednesday 745AM-330PM  [place]  Thursday/Friday 745AM-230PM  [place]     Specialty schedulers:   [phone] to make an appointment with any Specialty Provider.               If you need a medication refill, please contact us you may need lab work and/or a follow up visit prior to your refill (i.e. Antifungal medications).    If MRI needed please call Imaging at [phone] or [phone]    Knee scooters can be picked up at ANY Medical Supply stores with your knee scooter order. For a list of suppliers please ask.

## 2020-11-11 NOTE — NURSING NOTE
SMOKING CESSATION  What's in cigarette smoke? - Cigarette smoke contains over 4,000 chemicals. Nicotine is one of the main ingredients which is an insecticide/herbicide. It is poisonous to our nervous system, increases blood clotting risk, and decreases the body's defenses to fight off infection. Another chemical is Carbon Monoxide is an asphyxiating gas that permanently binds to blood cells and blocks the transport of oxygen. This leads to tissue death and decreases your metabolism. Tar is a chemical that coats your lungs and trachea which impairs new oxygen coming in and carbon dioxide getting out of your body.   How does smoking impact surgery? - Smoking is particularly hazardous with regards to surgery. Surgery puts stress on the body and a smoker's body is already under strain from these chemicals. Putting the two together, especially for an elective surgery, could be a recipe for disaster. Smoking before and after surgery increases your risk of heart problems, slow wound healing, delayed bone healing, blood clots, wound infection and anesthesia complications.   What are the benefits of quitting? - In 20 minutes your blood pressure will drop back down to normal. In 8 hours the carbon monoxide (a toxic gas) levels in your blood stream will drop by half, and oxygen levels will return to normal. In 48 hours your chance of having a heart attack will have decreased. All nicotine will have left your body. Your sense of taste and smell will return to a normal level. In 72 hours your bronchial tubes will relax, and your energy levels will increase. In 2 weeks your circulation will increase, and it will continue to improve for the next 10 weeks.    Recommendations for elective surgery - Ideally, patients should quit smoking 8 weeks before and at least 2 weeks after elective surgery in order to avoid complications. Simply cutting back on the amount of cigarettes smoked per day does not offer any benefit or decrease the  risk of poor wound healing, heart problems, and infection. Smokers should also start taking Vitamin C and B for two weeks before surgery and two weeks after surgery.    Ways to Stop Smokin. Nicotine patches, lozenges, or gum  2. Support Groups  3. Medications (see below)    List of Medications:  1. Varenicline Tartrate (CHANTIX)   2. Bupropion HCL (WELLBUTRIN, ZYBAN) - note: make sure Wellbutrin is for smoking cessation and not other issues   3. Nicotine Patch (NICODERM)   4. Nicotine Inhaler (NICOTROL)   5. Nicotine Gum Nicotine Polacrilex   6. Nicotine Lozenge: Nicotine Polacrilex (COMMIT)   * [facility] offers a smoking support group as well!  Please visit: [email or web address]  If you are interested in these, ask about getting a prescription or talk to your primary care doctor about what may be the best way for you to quit.

## 2020-11-11 NOTE — LETTER
11/11/2020         RE: Vasyl Park  [address]  [facility] [place] [address]        Dear Colleague,    Thank you for referring your patient, Vasyl Park, to the [facility]. Please see a copy of my visit note below.    Subjective:    9/4/20   Pt is seen today in consult from Chano Whaley for right third metatarsal fracture.  Patient injured his right foot at work on 8/19/2020.  Moving heavy object which came on top of his foot.  He had pain.  He ignored this and continue to walk on it.  Slowly over the next few days foot became more painful.  He continue to work.  He presented to clinic on 8/26 and diagnosed with a fracture.  Was given a walking boot.  This is helped.  He works on his feet.  He has not gone back to work.  The patient is a smoker.  He used to drink alcohol quite heavily in the past.  He also has history of psoriasis.  He states this affects both of his feet.    9/18/20 patient has been using the cam walker anytime he puts pressure on his foot.  He is not working.  Patient using crutches to keep off this.  He states his swelling and pain have improved.  Both are still present somewhat.  He is still smoking.     11/11/20 patient returns for evaluation of his right third metatarsal fracture..  He states is feeling better.  The edema is decreasing.  He still has numbness and tingling.         ROS:  See above       No Known Allergies    No current outpatient medications on file.       There is no problem list on file for this patient.      No past medical history on file.    No past surgical history on file.    No family history on file.    Social History     Tobacco Use     Smoking status: Smoker, Current Status Unknown     Smokeless tobacco: Never Used   Substance Use Topics     Alcohol use: Not on file         Exam:    Vitals: There were no vitals taken for this visit.  BMI: There is no height or weight on file to calculate BMI.  Height: Data Unavailable    Constitutional/  general:  Pt is in no apparent distress, appears well-nourished.  Cooperative with history and physical exam.     Psych:  The patient answered questions appropriately.  Normal affect.  Seems to have reasonable expectations, in terms of treatment.     Eyes:  Visual scanning/ tracking without deficit.     Ears:  Response to auditory stimuli is normal.  negative hearing aid devices.  Auricles in proper alignment.     Lymphatic:  Popliteal lymph nodes not enlarged.     Lungs:  Non labored breathing, non labored speech. No cough.  No audible wheezing. Even, quiet breathing.       Vascular:  positive pedal pulses bilaterally for  DP arteries.  Somewhat difficult to palpate tibial arteries secondary to ankle edema.  CFT < 3 sec.  positive ankle edema.  No hair growth noted.  His toes are cold to touch.    Neuro:  Alert and oriented x 3. Coordinated gait.  Light touch sensation is intact to the L4, L5, S1 distributions. No obvious deficits.  No evidence of neurological-based weakness, spasticity, or contracture in the lower extremities.     Derm: His skin is very dry and erythematous globally on his foot.  No hair growth is noted.  Plantarly the skin is quite thick.    Musculoskeletal:    Lower extremity muscle strength is normal.  Patient is ambulatory without an assistive device or brace.  No gross deformities.  Normal arch.  Pain upon palpation  of right third  metatarsal.  Edema almost totally resolved now.  No erythema or ecchymosis noted.  No sign of ulceration, infection, or drainage.  With loading the right third metatarsal head minimal pain.    Radiographic Exam:  X-Ray shows copious bone callus, however fracture not yet healed.     Assessment: Right foot third metatarsal fracture     Plan:  X-rays taken today of right foot.  Discussed he has copious bone callus.   discussed this is healing albeit slowly.  Patient will start tomorrow walking in good supportive shoe.  If there is no pain or edema then will slowly  increase time in shoe 1-2 hours per day until walking all day in good shoe.  If patient has pain or edema back in cam walker for 5 days and then will try again.  No running, jogging, or high impact activities.  Return to the clinic in 3 weeks.   Fracture care.        Paulie Burch DPM DPM, FACFAS        Again, thank you for allowing me to participate in the care of your patient.        Sincerely,        Paulie Burch DPM

## 2023-02-03 ENCOUNTER — HOSPITAL ENCOUNTER (EMERGENCY)
Facility: CLINIC | Age: 63
Discharge: HOME | End: 2023-02-03
Attending: EMERGENCY MEDICINE | Admitting: EMERGENCY MEDICINE

## 2023-02-03 VITALS
SYSTOLIC BLOOD PRESSURE: 129 MMHG | WEIGHT: 185 LBS | BODY MASS INDEX: 23.74 KG/M2 | RESPIRATION RATE: 16 BRPM | TEMPERATURE: 97.7 F | HEART RATE: 73 BPM | DIASTOLIC BLOOD PRESSURE: 74 MMHG | HEIGHT: 74 IN | OXYGEN SATURATION: 97 %

## 2023-02-03 DIAGNOSIS — F15.10 AMPHETAMINE ABUSE (H): ICD-10-CM

## 2023-02-03 PROCEDURE — 99285 EMERGENCY DEPT VISIT HI MDM: CPT

## 2023-02-03 ASSESSMENT — ENCOUNTER SYMPTOMS
VOMITING: 0
NAUSEA: 0
SHORTNESS OF BREATH: 0

## 2023-02-03 ASSESSMENT — ACTIVITIES OF DAILY LIVING (ADL)
ADLS_ACUITY_SCORE: 35

## 2023-02-03 NOTE — ED PROVIDER NOTES
"  History     Chief Complaint:  Drug / Alcohol Assessment       HPI   Vasyl Park is a 62 year old male who presents via EMS with unusual behavior and drug abuse. He was found by Cristobal WHITT when he was brandishing a sword screaming \"people are trying to kill me and there's snakes everywhere\". He admits to meth use with last use yesterday morning. He was given droperidol 2.5 mg by EMS. Denies any medical problems. Denies chest pain, shortness of breath, nausea or vomiting.    Independent Historian:   EMS - They provided additional history, see details above.    Review of External Notes: No prior visits for amphetamine abuse    ROS:  Review of Systems   Respiratory: Negative for shortness of breath.    Cardiovascular: Negative for chest pain.   Gastrointestinal: Negative for nausea and vomiting.   10 point review of systems performed and is negative except as above and in HPI.    Allergies:  No Known Allergies     Medications:    The patient is not currently taking any prescribed medications.    Past Medical History:    The patient denies past medical history.    Social History:  The patient presents to the ED via EMS alone.  PCP: Gerber Rosales     Physical Exam     Patient Vitals for the past 24 hrs:   BP Temp Temp src Pulse Resp SpO2 Height Weight   02/03/23 0306 134/78 97.7  F (36.5  C) Temporal 91 18 96 % 1.88 m (6' 2\") 83.9 kg (185 lb)        Physical Exam    General: Sleeping on the gurney, rouses to verbal stimuli  Head:  The scalp, face, and head appear normal  Mouth/Throat: Mucus membranes are moist  CV:  Regular rate    Normal S1 and S2  No pathological murmur   Resp:  Breath sounds clear and equal bilaterally    Non-labored, no retractions or accessory muscle use    No coarseness    No wheezing   GI:  Abdomen is soft, no rigidity    No tenderness to palpation  MS:  Normal motor assessment of all extremities.    Good capillary refill noted.    Skin:   No rash or lesions noted.  Neuro:  Speech is " normal and fluent. No apparent deficit. Orientated to self and situation.  Psych:  Awake. Alert. Denies thoughts of harming himself or others.         Emergency Department Course       Emergency Department Course & Assessments:    Social Determinants of Health affecting care:   drug use    Assessments:  0445 I obtained history and examined the patient as noted above.   I rechecked the patient and explained findings.    Disposition:  The patient was discharged to home.     Impression & Plan      Medical Decision Making:  Vasyl Park is a 62 year old male who presents for evaluation of drug abuse.  They have been using meth and signs and symptoms are consistent with drug intoxication.  A broad differential diagnosis was considered including acute infection, metabolic derangement, intracerebral issue (stroke, bleed, tumor, encephalitis, meningitis), medication side effect, psychiatric illness, etc.  Given course in Emergency Department, I doubt at this time there are serious underlying etiologies for the patients symptoms.  I offered the patient DEC/ eval and they decline.  Plan is close follow-up of primary care physician for further abuse counseling and referral.        Diagnosis:    ICD-10-CM    1. Amphetamine abuse (H)  F15.10            Scribe Disclosure:  I, Kezia Christiansen, am serving as a scribe at 4:20 AM on 2/3/2023 to document services personally performed by Delfina Omer MD based on my observations and the provider's statements to me.     2/3/2023   Delfina Omer MD Debroux, Karah M, MD  02/03/23 0975

## 2023-02-03 NOTE — ED NOTES
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

## 2023-02-03 NOTE — ED TRIAGE NOTES
"62 year old male presents to the ED because Dave WHITT found him in the middle of the street brandishing a sword screaming \"people are trying to kill me and there's snakes everywhere\". Pt reports he last smoked meth yesterday morning and hasn't slept in several days. Was given 2.5 mg of droperidol by EMS.      Triage Assessment     Row Name 02/03/23 0259       Triage Assessment (Adult)    Airway WDL WDL       Respiratory WDL    Respiratory WDL WDL       Skin Circulation/Temperature WDL    Skin Circulation/Temperature WDL WDL       Cardiac WDL    Cardiac WDL WDL       Cognitive/Neuro/Behavioral WDL    Cognitive/Neuro/Behavioral WDL WDL              "